# Patient Record
Sex: MALE | Race: WHITE
[De-identification: names, ages, dates, MRNs, and addresses within clinical notes are randomized per-mention and may not be internally consistent; named-entity substitution may affect disease eponyms.]

---

## 2021-10-07 ENCOUNTER — HOSPITAL ENCOUNTER (OUTPATIENT)
Dept: HOSPITAL 47 - RADXRMAIN | Age: 61
Discharge: HOME | End: 2021-10-07
Attending: NURSE PRACTITIONER
Payer: COMMERCIAL

## 2021-10-07 DIAGNOSIS — Z87.891: ICD-10-CM

## 2021-10-07 DIAGNOSIS — M25.852: ICD-10-CM

## 2021-10-07 DIAGNOSIS — M25.851: ICD-10-CM

## 2021-10-07 DIAGNOSIS — M51.36: Primary | ICD-10-CM

## 2021-10-07 PROCEDURE — 73521 X-RAY EXAM HIPS BI 2 VIEWS: CPT

## 2021-10-07 PROCEDURE — 71046 X-RAY EXAM CHEST 2 VIEWS: CPT

## 2021-10-07 PROCEDURE — 72110 X-RAY EXAM L-2 SPINE 4/>VWS: CPT

## 2021-10-07 NOTE — XR
EXAMINATION TYPE: XR lumbosacral spine min 4V

 

DATE OF EXAM: 10/7/2021

 

CLINICAL HISTORY: Chronic pain.

 

TECHNIQUE: Frontal, lateral, and oblique images of the lumbar spine are obtained.

 

COMPARISON: None

 

FINDINGS:  There are 5 lumbar type vertebral bodies identified. There is grade 1 anterolisthesis L4 o
n L5. Mild disc space narrowing L2-L3 level. Mild disc space narrowing with vacuum disc phenomenon L5
-S1 level. Mild to moderate multilevel anterior and lateral spurring. Oblique images appear within no
rmal limits. Moderate vascular truncation overlying abdominal aorta. No acute fracture or dislocation
 is seen.

 

IMPRESSION: As above.

## 2021-10-07 NOTE — XR
EXAMINATION TYPE: XR chest 2V

 

DATE OF EXAM: 10/7/2021

 

COMPARISON: NONE

 

HISTORY: History of tobacco use.

 

TECHNIQUE:  Frontal and lateral views of the chest are obtained.

 

FINDINGS:  There is no focal air space opacity, pleural effusion, or pneumothorax seen.  The cardiac 
silhouette size is within normal limits.   The osseous structures are intact.

 

IMPRESSION:  No acute process.

## 2021-10-07 NOTE — XR
EXAMINATION TYPE: XR Hip Bilateral and AP pelvis

 

DATE OF EXAM: 10/7/2021

 

COMPARISON: NONE

 

HISTORY: Pelvic and bilateral hip pain

 

TECHNIQUE: A single AP view of the pelvis is obtained. Two views of the bilateral hips are obtained. 
 

 

FINDINGS:  There is no acute fracture/dislocation evident in the pelvis. There is some asymmetric naty
rowing of the right sacroiliac joint. Mild-to-moderate symmetric axial joint space loss in both hips.
 Symmetric mild acetabular spurring bilaterally. Pubic symphysis is intact.

 

Two views of bilateral hips show no acute fracture or dislocation.  No focal lytic or sclerotic lesio
n seen in the proximal femurs bilaterally.  The overlying soft tissue is unremarkable bilaterally.  

 

IMPRESSION:  As above. none